# Patient Record
Sex: MALE | Race: WHITE | NOT HISPANIC OR LATINO | URBAN - NONMETROPOLITAN AREA
[De-identification: names, ages, dates, MRNs, and addresses within clinical notes are randomized per-mention and may not be internally consistent; named-entity substitution may affect disease eponyms.]

---

## 2019-09-12 ENCOUNTER — IMPORTED ENCOUNTER (OUTPATIENT)
Dept: URBAN - NONMETROPOLITAN AREA CLINIC 1 | Facility: CLINIC | Age: 74
End: 2019-09-12

## 2019-09-12 PROBLEM — H16.223: Noted: 2019-09-12

## 2019-09-12 PROBLEM — H52.4: Noted: 2019-09-12

## 2019-09-12 PROBLEM — H25.13: Noted: 2019-09-12

## 2019-09-12 PROBLEM — E11.9: Noted: 2019-09-12

## 2019-09-12 PROBLEM — H35.3131: Noted: 2019-09-12

## 2019-09-12 PROCEDURE — 92015 DETERMINE REFRACTIVE STATE: CPT

## 2019-09-12 PROCEDURE — 92014 COMPRE OPH EXAM EST PT 1/>: CPT

## 2019-09-12 NOTE — PATIENT DISCUSSION
MYRANDA OU (early)-Discussed diagonsis in detail with patient very early OU-Patient is to start AReds sample given to patient in office today-Recommend monitoring an AM grid daily gave one in office today with and discussed in detail-Recommend no smoking and eating leafy greens-Will order an OCT MAC for next visit [place] OU- Discussed diagnosis in detail with patient- Discussed signs and symptoms of progression- Discussed UV protection- No treatment needed at this time- Continue to monitorDES OU- Discussed diagnosis in detail with patient- Discussed signs and symptoms of progression- Recommend patient drinking plenty of water and starting Omega 3’s - Recommend Refresh or Systane  throughout the day- Consider Restasis or plugs in the future if no improvement- Continue to monitor NIDDM (2013)- Discussed diagnosis in detail with patient- Stressed importance of good blood sugar control- Recommend no soda’s- Patient reports last A1C was 6. 1-Letter to Dr. Jerod Aguilar  - Continue to monitorHyperopia / Presbyopia OU- Discussed diagnosis in detail with patient- New glasses Rx given today- History of Lasik done over 10 years ago - Continue to monitor; 's Notes: MR 9/12/19 DFE 9/12/19

## 2020-09-17 ENCOUNTER — IMPORTED ENCOUNTER (OUTPATIENT)
Dept: URBAN - NONMETROPOLITAN AREA CLINIC 1 | Facility: CLINIC | Age: 75
End: 2020-09-17

## 2020-09-17 PROCEDURE — 92134 CPTRZ OPH DX IMG PST SGM RTA: CPT

## 2020-09-17 PROCEDURE — 92014 COMPRE OPH EXAM EST PT 1/>: CPT

## 2020-09-17 NOTE — PATIENT DISCUSSION
ARMD OU (early)- Discussed diagonsis in detail with patient very early OU- Patient is to continue Preservision BID - Recommend monitoring an Amsler grid daily. 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 1-Letter to Dr. Jessenia Emerson  - Continue to monitorHyperopia / Presbyopia OU- Discussed diagnosis in detail with patient- History of Lasik done over 10 years ago - Continue to monitor; 's Notes: MR 9/12/19 DFE 9/12/19

## 2021-09-23 ENCOUNTER — IMPORTED ENCOUNTER (OUTPATIENT)
Dept: URBAN - NONMETROPOLITAN AREA CLINIC 1 | Facility: CLINIC | Age: 76
End: 2021-09-23

## 2021-09-23 PROBLEM — E11.9: Noted: 2019-09-12

## 2021-09-23 PROBLEM — H25.813: Noted: 2021-09-23

## 2021-09-23 PROBLEM — H35.3131: Noted: 2019-09-12

## 2021-09-23 PROBLEM — H52.4: Noted: 2019-09-12

## 2021-09-23 PROBLEM — H16.223: Noted: 2019-09-12

## 2021-09-23 PROCEDURE — 92134 CPTRZ OPH DX IMG PST SGM RTA: CPT

## 2021-09-23 PROCEDURE — 92015 DETERMINE REFRACTIVE STATE: CPT

## 2021-09-23 PROCEDURE — 99214 OFFICE O/P EST MOD 30 MIN: CPT

## 2021-09-23 NOTE — PATIENT DISCUSSION
ARMD OU (early)- Discussed diagonsis in detail with patient very early OU- Patient is to continue Preservision BID - Recommend monitoring an Amsler grid daily. Patient to call or come into the office ASAP if any changes noted from today.  Another grid given 9/23/21 by Dr. 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- Continue to monitor  Catar[place] OU- Discussed diagnosis in detail with patient- Discussed signs and symptoms of progression- Discussed UV protection- No treatment needed at this time- Continue to monitorDES OU- Discussed diagnosis in detail with patient- Discussed signs and symptoms of progression- Recommend patient drinking plenty of water and starting Omega 3’s - Recommend Refresh or Systane throughout the day- Continue to monitor NIDDM (2013)- Discussed diagnosis in detail with patient- Stressed importance of good blood sugar control- Recommend no soda’s- Patient reports last A1C was 6.1- Letter to Dr. Jose Kaur  - Continue to monitorHyperopia / Dene Bret- Discussed diagnosis in detail with patient- History of Lasik done over 10 years ago - New glasses RX givnen today - Continue to monitor; 's Notes: MR 9/12/19 DFE 9/12/19

## 2022-04-09 ASSESSMENT — TONOMETRY
OS_IOP_MMHG: 11
OD_IOP_MMHG: 11
OS_IOP_MMHG: 09
OD_IOP_MMHG: 10
OD_IOP_MMHG: 12
OS_IOP_MMHG: 12

## 2022-04-09 ASSESSMENT — VISUAL ACUITY
OD_CC: 20/30+
OD_GLARE: 20/200
OS_GLARE: 20/100
OD_CC: 20/30
OU_CC: 20/20-
OS_CC: 20/25+
OD_CC: 20/29
OS_CC: 20/25-2
OS_CC: 20/20-1

## 2022-09-26 ENCOUNTER — ESTABLISHED PATIENT (OUTPATIENT)
Dept: URBAN - NONMETROPOLITAN AREA CLINIC 1 | Facility: CLINIC | Age: 77
End: 2022-09-26

## 2022-09-26 DIAGNOSIS — H52.4: ICD-10-CM

## 2022-09-26 DIAGNOSIS — H25.813: ICD-10-CM

## 2022-09-26 DIAGNOSIS — E11.9: ICD-10-CM

## 2022-09-26 DIAGNOSIS — H35.3131: ICD-10-CM

## 2022-09-26 DIAGNOSIS — H16.223: ICD-10-CM

## 2022-09-26 PROCEDURE — 99214 OFFICE O/P EST MOD 30 MIN: CPT

## 2022-09-26 PROCEDURE — 92015 DETERMINE REFRACTIVE STATE: CPT

## 2022-09-26 ASSESSMENT — TONOMETRY
OS_IOP_MMHG: 12
OD_IOP_MMHG: 16

## 2022-09-26 ASSESSMENT — VISUAL ACUITY
OS_SC: 20/30
OD_SC: 20/40+2

## 2022-09-26 NOTE — PATIENT DISCUSSION
Discussed diagnosis in detail with patient. Discussed signs and symptoms of progression. Recommend patient drinking plenty of water and starting Omega 3’s.  Recommend Refresh or Systane throughout the day.  Continue to monitor.

## 2022-09-26 NOTE — PATIENT DISCUSSION
Discussed diagnosis in detail with patient very early OU. Patient is to continue Preservision BID. Recommend monitoring an Amsler grid. Patient to call or come into the office ASAP if any changes noted from today. Another grid given 9/26/22 by Dr. Idolina Nissen with instructions on how to use. Recommend no smoking and eating leafy greens. Continue to monitor.

## 2022-09-26 NOTE — PATIENT DISCUSSION
Discussed diagnosis in detail with patient. Discussed signs and symptoms of progression. Discussed UV protection.  No treatment needed at this time.  Continue to monitor.

## 2023-04-07 ENCOUNTER — FOLLOW UP (OUTPATIENT)
Dept: URBAN - NONMETROPOLITAN AREA CLINIC 1 | Facility: CLINIC | Age: 78
End: 2023-04-07

## 2023-04-07 DIAGNOSIS — H25.813: ICD-10-CM

## 2023-04-07 DIAGNOSIS — H40.013: ICD-10-CM

## 2023-04-07 DIAGNOSIS — H35.3131: ICD-10-CM

## 2023-04-07 PROCEDURE — 92250 FUNDUS PHOTOGRAPHY W/I&R: CPT

## 2023-04-07 PROCEDURE — 99214 OFFICE O/P EST MOD 30 MIN: CPT

## 2023-04-07 ASSESSMENT — VISUAL ACUITY
OU_SC: 20/25-2
OD_SC: 20/40-1
OS_SC: 20/25-1

## 2023-04-07 ASSESSMENT — TONOMETRY
OD_IOP_MMHG: 14
OS_IOP_MMHG: 14

## 2023-10-12 ENCOUNTER — ESTABLISHED PATIENT (OUTPATIENT)
Dept: URBAN - NONMETROPOLITAN AREA CLINIC 1 | Facility: CLINIC | Age: 78
End: 2023-10-12

## 2023-10-12 DIAGNOSIS — H35.3131: ICD-10-CM

## 2023-10-12 DIAGNOSIS — H40.013: ICD-10-CM

## 2023-10-12 DIAGNOSIS — H52.4: ICD-10-CM

## 2023-10-12 DIAGNOSIS — H25.813: ICD-10-CM

## 2023-10-12 DIAGNOSIS — E11.9: ICD-10-CM

## 2023-10-12 PROCEDURE — 92014 COMPRE OPH EXAM EST PT 1/>: CPT

## 2023-10-12 PROCEDURE — 92015 DETERMINE REFRACTIVE STATE: CPT

## 2023-10-12 ASSESSMENT — VISUAL ACUITY
OS_SC: 20/22-1
OD_SC: 20/29

## 2023-10-12 ASSESSMENT — TONOMETRY
OD_IOP_MMHG: 14
OS_IOP_MMHG: 14

## 2024-05-28 ENCOUNTER — FOLLOW UP (OUTPATIENT)
Dept: URBAN - NONMETROPOLITAN AREA CLINIC 1 | Facility: CLINIC | Age: 79
End: 2024-05-28

## 2024-05-28 DIAGNOSIS — H35.3131: ICD-10-CM

## 2024-05-28 DIAGNOSIS — T15.00XA: ICD-10-CM

## 2024-05-28 DIAGNOSIS — E11.9: ICD-10-CM

## 2024-05-28 DIAGNOSIS — H40.013: ICD-10-CM

## 2024-05-28 PROCEDURE — 92250 FUNDUS PHOTOGRAPHY W/I&R: CPT

## 2024-05-28 PROCEDURE — 99213 OFFICE O/P EST LOW 20 MIN: CPT

## 2024-05-28 PROCEDURE — 65222 REMOVE FOREIGN BODY FROM EYE: CPT | Mod: NC,LT

## 2024-05-28 RX ORDER — POLYMYXIN B SULFATE AND TRIMETHOPRIM 1; 10000 MG/ML; [USP'U]/ML: 1 SOLUTION OPHTHALMIC

## 2024-05-28 ASSESSMENT — TONOMETRY
OD_IOP_MMHG: 14
OS_IOP_MMHG: 14

## 2024-05-28 ASSESSMENT — VISUAL ACUITY
OD_SC: 20/30-2
OS_SC: 20/20-2
OU_SC: 20/20

## 2024-11-06 ENCOUNTER — EMERGENCY VISIT (OUTPATIENT)
Dept: URBAN - NONMETROPOLITAN AREA CLINIC 1 | Facility: CLINIC | Age: 79
End: 2024-11-06

## 2024-11-06 DIAGNOSIS — H16.141: ICD-10-CM

## 2024-11-06 DIAGNOSIS — H16.223: ICD-10-CM

## 2024-11-06 PROCEDURE — 99213 OFFICE O/P EST LOW 20 MIN: CPT

## 2024-11-06 RX ORDER — PREDNISOLONE ACETATE 10 MG/ML: 1 SUSPENSION/ DROPS OPHTHALMIC TWICE A DAY

## 2024-12-24 ENCOUNTER — FOLLOW UP (OUTPATIENT)
Age: 79
End: 2024-12-24

## 2024-12-24 DIAGNOSIS — H52.4: ICD-10-CM

## 2024-12-24 DIAGNOSIS — H40.013: ICD-10-CM

## 2024-12-24 DIAGNOSIS — E11.9: ICD-10-CM

## 2024-12-24 PROCEDURE — 92015 DETERMINE REFRACTIVE STATE: CPT

## 2024-12-24 PROCEDURE — 99213 OFFICE O/P EST LOW 20 MIN: CPT

## 2025-06-24 ENCOUNTER — FOLLOW UP (OUTPATIENT)
Age: 80
End: 2025-06-24

## 2025-06-24 DIAGNOSIS — E11.9: ICD-10-CM

## 2025-06-24 DIAGNOSIS — H35.3131: ICD-10-CM

## 2025-06-24 DIAGNOSIS — H40.013: ICD-10-CM

## 2025-06-24 PROCEDURE — 92134 CPTRZ OPH DX IMG PST SGM RTA: CPT | Mod: NC

## 2025-06-24 PROCEDURE — 92133 CPTRZD OPH DX IMG PST SGM ON: CPT

## 2025-06-24 PROCEDURE — 99213 OFFICE O/P EST LOW 20 MIN: CPT

## 2025-06-24 PROCEDURE — 92083 EXTENDED VISUAL FIELD XM: CPT
